# Patient Record
Sex: MALE | Race: AMERICAN INDIAN OR ALASKA NATIVE
[De-identification: names, ages, dates, MRNs, and addresses within clinical notes are randomized per-mention and may not be internally consistent; named-entity substitution may affect disease eponyms.]

---

## 2017-06-18 ENCOUNTER — HOSPITAL ENCOUNTER (EMERGENCY)
Dept: HOSPITAL 14 - H.ER | Age: 27
Discharge: HOME | End: 2017-06-18
Payer: SELF-PAY

## 2017-06-18 VITALS — TEMPERATURE: 98.8 F | DIASTOLIC BLOOD PRESSURE: 76 MMHG | HEART RATE: 84 BPM | SYSTOLIC BLOOD PRESSURE: 144 MMHG

## 2017-06-18 VITALS — OXYGEN SATURATION: 100 % | RESPIRATION RATE: 16 BRPM

## 2017-06-18 DIAGNOSIS — L03.90: Primary | ICD-10-CM

## 2017-06-18 NOTE — ED PDOC
HPI: General Adult


Time Seen by Provider: 06/18/17 12:16


Chief Complaint (Nursing): Finger,Hand,&Wrist


Chief Complaint (Provider): right finger pain


History Per: Patient (28 y/o male here for ?insect bite on right index finger 

yesterday.  States he noted area to be itchy at that time.  Today has pain/

swelling in area of possible insect bite.  Denies any difficulty flexing/

extending digit.  No fevers/chills.)





Past Medical History


Reviewed: Historical Data, Nursing Documentation, Vital Signs


Vital Signs: 





 Last Vital Signs











Temp  98.8 F   06/18/17 11:51


 


Pulse  84   06/18/17 11:51


 


Resp  16   06/18/17 11:51


 


BP  144/76   06/18/17 11:51


 


Pulse Ox  100   06/18/17 11:51














- Medical History


PMH: Anxiety, HTN


   Denies: HIV, Seizures, Sexually Transmitted Disease





- Family History


Family History: States: Unknown Family Hx





- Immunization History


Hx Tetanus Toxoid Vaccination: No


Hx Influenza Vaccination: No


Hx Pneumococcal Vaccination: No





- Home Medications


Home Medications: 


 Ambulatory Orders











 Medication  Instructions  Recorded


 


Amoxicillin/Clavulanate [Augmentin 1 tab PO BID #20 tab 01/21/17





500 MG-125 MG]  


 


Fluticasone Propionate [Flonase] 2 spr NS DAILY PRN #1 bottle 01/21/17


 


Naproxen [Naprosyn] 500 mg PO BID PRN #30 tab 01/21/17


 


Promethazine DM [Phenergan DM 5 - 10 ml PO Q8 PRN #120 ml 01/21/17





Syrup]  


 


Cephalexin [Keflex] 500 mg PO QID #28 capsule 06/18/17


 


Sulfamethoxazole/Trimethoprim 2 tab PO BID #28 tab 06/18/17





[Bactrim  mg-160 mg]  














- Allergies


Allergies/Adverse Reactions: 


 Allergies











Allergy/AdvReac Type Severity Reaction Status Date / Time


 


No Known Allergies Allergy   Verified 01/21/17 13:20














Review of Systems


ROS Statement: Except As Marked, All Systems Reviewed And Found Negative


Musculoskeletal: Positive for: Other (finger pain)





Physical Exam





- Reviewed


Nursing Documentation Reviewed: Yes


Vital Signs Reviewed: Yes





- Physical Exam


Appears: Positive for: Well, Non-toxic, No Acute Distress


Head Exam: Positive for: ATRAUMATIC, NORMAL INSPECTION, NORMOCEPHALIC


Skin: Positive for: Normal Color, Warm, DRY


Eye Exam: Positive for: EOMI, Normal appearance, PERRL


ENT: Positive for: Normal ENT Inspection


Neck: Positive for: Normal, Painless ROM


Cardiovascular/Chest: Positive for: Regular Rate, Rhythm


Respiratory: Positive for: CNT, Normal Breath Sounds


Gastrointestinal/Abdominal: Positive for: Normal Exam, Bowel Sounds, Soft


Back: Positive for: Normal Inspection


Extremity: Positive for: Normal ROM, Other (swelling/erythema noted middle 

phalanx right hand around small lesion.  Able to flex and extend digit without 

difficulty.)


Neurologic/Psych: Positive for: Alert, Oriented





- ECG


O2 Sat by Pulse Oximetry: 100





- Progress


ED Course And Treament: 





Tdap 0.5ml IM x 1 dose





KEflex 500mg x 1 dose





Bactrim DS 2 tab po x 1 dose








Disposition





- Clinical Impression


Clinical Impression: 


 Insect bite, Cellulitis








- Patient ED Disposition


Is Patient to be Admitted: No





- Disposition


Disposition: Routine/Home


Disposition Time: 12:19


Condition: FAIR


Additional Instructions: 


F/U WITH YOUR PMD OR ED IN 24-48 HOURS FOR EVALUATION OF YOUR FINGER WOUND.  

RETURN FOR ANY WORSENING OF SYMPTOMS DESPITE MEDICATIONS.


Prescriptions: 


Cephalexin [Keflex] 500 mg PO QID #28 capsule


Sulfamethoxazole/Trimethoprim [Bactrim  mg-160 mg] 2 tab PO BID #28 tab


Instructions:  Insect Bite or Sting (ED), Cellulitis (ED)

## 2017-09-01 ENCOUNTER — HOSPITAL ENCOUNTER (EMERGENCY)
Dept: HOSPITAL 14 - H.ER | Age: 27
Discharge: HOME | End: 2017-09-01
Payer: COMMERCIAL

## 2017-09-01 VITALS
SYSTOLIC BLOOD PRESSURE: 132 MMHG | HEART RATE: 79 BPM | OXYGEN SATURATION: 98 % | DIASTOLIC BLOOD PRESSURE: 74 MMHG | RESPIRATION RATE: 18 BRPM | TEMPERATURE: 98.6 F

## 2017-09-01 DIAGNOSIS — F41.9: ICD-10-CM

## 2017-09-01 DIAGNOSIS — R10.9: Primary | ICD-10-CM

## 2017-09-01 DIAGNOSIS — I10: ICD-10-CM

## 2017-09-01 LAB
ALBUMIN/GLOB SERPL: 1.6 {RATIO} (ref 1–2.1)
ALP SERPL-CCNC: 80 U/L (ref 38–126)
ALT SERPL-CCNC: 61 U/L (ref 21–72)
APTT BLD: 29.1 SECONDS (ref 25.6–37.1)
AST SERPL-CCNC: 28 U/L (ref 17–59)
BASOPHILS # BLD AUTO: 0 K/UL (ref 0–0.2)
BASOPHILS NFR BLD: 0.2 % (ref 0–2)
BILIRUB SERPL-MCNC: 0.5 MG/DL (ref 0.2–1.3)
BUN SERPL-MCNC: 15 MG/DL (ref 9–20)
CALCIUM SERPL-MCNC: 9.7 MG/DL (ref 8.4–10.2)
CHLORIDE SERPL-SCNC: 109 MMOL/L (ref 98–107)
CO2 SERPL-SCNC: 22 MMOL/L (ref 22–30)
EOSINOPHIL # BLD AUTO: 0.3 K/UL (ref 0–0.7)
EOSINOPHIL NFR BLD: 2.9 % (ref 0–4)
ERYTHROCYTE [DISTWIDTH] IN BLOOD BY AUTOMATED COUNT: 16.2 % (ref 11.5–14.5)
GLOBULIN SER-MCNC: 2.9 GM/DL (ref 2.2–3.9)
GLUCOSE SERPL-MCNC: 95 MG/DL (ref 75–110)
HCT VFR BLD CALC: 44 % (ref 35–51)
LIPASE SERPL-CCNC: 28 U/L (ref 23–300)
LYMPHOCYTES # BLD AUTO: 2.7 K/UL (ref 1–4.3)
LYMPHOCYTES NFR BLD AUTO: 29.1 % (ref 20–40)
MCH RBC QN AUTO: 23 PG (ref 27–31)
MCHC RBC AUTO-ENTMCNC: 31.5 G/DL (ref 33–37)
MCV RBC AUTO: 73 FL (ref 80–94)
MONOCYTES # BLD: 0.6 K/UL (ref 0–0.8)
MONOCYTES NFR BLD: 6 % (ref 0–10)
NEUTROPHILS # BLD: 5.8 K/UL (ref 1.8–7)
NEUTROPHILS NFR BLD AUTO: 61.8 % (ref 50–75)
NRBC BLD AUTO-RTO: 0.1 % (ref 0–0)
PLATELET # BLD: 125 K/UL (ref 130–400)
PMV BLD AUTO: 10.2 FL (ref 7.2–11.7)
POTASSIUM SERPL-SCNC: 4 MMOL/L (ref 3.6–5)
PROT SERPL-MCNC: 7.4 G/DL (ref 6.3–8.2)
SODIUM SERPL-SCNC: 143 MMOL/L (ref 132–148)
WBC # BLD AUTO: 9.4 K/UL (ref 4.8–10.8)

## 2017-09-01 PROCEDURE — 85610 PROTHROMBIN TIME: CPT

## 2017-09-01 PROCEDURE — 83992 ASSAY FOR PHENCYCLIDINE: CPT

## 2017-09-01 PROCEDURE — 99284 EMERGENCY DEPT VISIT MOD MDM: CPT

## 2017-09-01 PROCEDURE — 85025 COMPLETE CBC W/AUTO DIFF WBC: CPT

## 2017-09-01 PROCEDURE — 83690 ASSAY OF LIPASE: CPT

## 2017-09-01 PROCEDURE — 80353 DRUG SCREENING COCAINE: CPT

## 2017-09-01 PROCEDURE — 82948 REAGENT STRIP/BLOOD GLUCOSE: CPT

## 2017-09-01 PROCEDURE — 80361 OPIATES 1 OR MORE: CPT

## 2017-09-01 PROCEDURE — 85730 THROMBOPLASTIN TIME PARTIAL: CPT

## 2017-09-01 PROCEDURE — 80345 DRUG SCREENING BARBITURATES: CPT

## 2017-09-01 PROCEDURE — 80349 CANNABINOIDS NATURAL: CPT

## 2017-09-01 PROCEDURE — 80324 DRUG SCREEN AMPHETAMINES 1/2: CPT

## 2017-09-01 PROCEDURE — 80358 DRUG SCREENING METHADONE: CPT

## 2017-09-01 PROCEDURE — 80053 COMPREHEN METABOLIC PANEL: CPT

## 2017-09-01 PROCEDURE — 80346 BENZODIAZEPINES1-12: CPT

## 2017-09-01 NOTE — ED PDOC
HPI: Abdomen


Time Seen by Provider: 09/01/17 15:15


Chief Complaint (Nursing): Abdominal Pain


Chief Complaint (Provider): loss of appetite


History Per: Patient


Onset/Duration Of Symptoms: Days (6), Intermittent Episodes


Current Symptoms Are (Timing): Still Present


Location Of Pain/Discomfort: Diffuse


Quality Of Discomfort: Gas


Associated Symptoms: Loss Of Appetite.  denies: Fever, Chills, Nausea, Vomiting

, Diarrhea, Constipation, Urinary Symptoms


Exacerbating Factors: None


Alleviating Factors: None


Additional Complaint(s): 





Six days ago started to have intermittent gas pains which he thought was hunger

, but when he would eat, feel full too quickly and not eat much. Pain resolved 

2 days ago. But loss of appetite and rapid fullness persists. Concerned that he 

seems to have unintentionally lost weight as well (about 15 lbs over 2 months)


2 weeks ago had an episode of severe diarrhea associated with more severe 

abdominal cramping, which resolved spontaneously after a day.





PMD Dr Wilson (not seen in about a year)





Past Medical History


Reviewed: Historical Data, Nursing Documentation, Vital Signs


Vital Signs: 


 Last Vital Signs











Temp  97.2 F L  09/01/17 15:08


 


Pulse  114 H  09/01/17 15:08


 


Resp  20   09/01/17 15:08


 


BP  129/82   09/01/17 15:08


 


Pulse Ox  99   09/01/17 15:49














- Medical History


PMH: Anxiety, HTN (stopped taking meds)


   Denies: HIV, Seizures, Sexually Transmitted Disease





- Surgical History


Surgical History: No Surg Hx





- Family History


Family History: States: Unknown Family Hx





- Social History


Current smoker - smoking cessation education provided: No


Alcohol: Occasional


Drugs: Cannabis





- Home Medications


Home Medications: 


 Ambulatory Orders











 Medication  Instructions  Recorded


 


Amoxicillin/Clavulanate [Augmentin 1 tab PO BID #20 tab 01/21/17





500 MG-125 MG]  


 


Fluticasone Propionate [Flonase] 2 spr NS DAILY PRN #1 bottle 01/21/17


 


Naproxen [Naprosyn] 500 mg PO BID PRN #30 tab 01/21/17


 


Promethazine DM [Phenergan DM 5 - 10 ml PO Q8 PRN #120 ml 01/21/17





Syrup]  


 


Cephalexin [Keflex] 500 mg PO QID #28 capsule 06/18/17


 


Sulfamethoxazole/Trimethoprim 2 tab PO BID #28 tab 06/18/17





[Bactrim  mg-160 mg]  


 


Omeprazole Magnesium [Prilosec Otc] 20 mg PO DAILY #30 tcp 09/01/17














- Allergies


Allergies/Adverse Reactions: 


 Allergies











Allergy/AdvReac Type Severity Reaction Status Date / Time


 


No Known Allergies Allergy   Verified 01/21/17 13:20














Review of Systems


ROS Statement: Except As Marked, All Systems Reviewed And Found Negative (and 

as per HPI)


Gastrointestinal: Positive for: Abdominal Pain.  Negative for: Nausea, Vomiting

, Diarrhea, Constipation, Melena, Hematochezia, Hematemesis, Rectal Pain


Genitourinary Male: Negative for: Dysuria, Frequency





Physical Exam





- Reviewed


Nursing Documentation Reviewed: Yes


Vital Signs Reviewed: Yes





- Physical Exam


Appears: Positive for: Non-toxic, No Acute Distress


Head Exam: Positive for: ATRAUMATIC, NORMOCEPHALIC


Skin: Positive for: Warm, Dry


Eye Exam: Positive for: EOMI, PERRL


ENT: Positive for: Other (dry muc membranes).  Negative for: Pharyngeal Erythema

, Tonsillar Exudate


Neck: Positive for: Painless ROM, Supple


Cardiovascular/Chest: Positive for: Chest Non Tender, Tachycardia (regular 

rhythm)


Respiratory: Positive for: Normal Breath Sounds.  Negative for: Wheezing, 

Respiratory Distress


Gastrointestinal/Abdominal: Positive for: Bowel Sounds, Soft.  Negative for: 

Tenderness, Mass, Distended, Guarding, Rebound


Back: Positive for: Normal Inspection.  Negative for: Decreased ROM


Extremity: Positive for: Normal ROM.  Negative for: Pedal Edema, Deformity


Lymphatic: Negative for: Adenopathy


Neurologic/Psych: Positive for: Alert, Mood/Affect (mildly anxious affect).  

Negative for: Motor/Sensory Deficits





- Laboratory Results


Result Diagrams: 


 09/01/17 15:41





 09/01/17 15:41





- ECG


O2 Sat by Pulse Oximetry: 99





Medical Decision Making


Medical Decision Making: 





Persistent loss of appetite following episodes of abdominal pain which have 

resolved. Signs of dehydration, otherwise physical exam benign and pt in no 

distress. 





Ddx include but not limited to gastritis, PUD, pancreatitis, hepatitis, 

dyspepsia, dehydration.





No emergently significant lab abnormalities.





DW pt findings. Rx as gastritis/dyspepsia. Follow up clinic and possible GI 

clinic.





Disposition





- Clinical Impression


Clinical Impression: 


 Abdominal pain





Counseled Patient/Family Regarding: Studies Performed, Diagnosis, Need For 

Followup, Rx Given





- Disposition


Referrals: 


[facility] [Outside]


[facility] [Outside] (CALL TO SETUP APPOINTMENT WITH CLINIC 

WITHIN A WEEK. FROM THERE YOU WILL GET A REFERRAL TO GI CLINIC.)


Disposition: Routine/Home


Disposition Time: 17:01


Condition: GOOD


Prescriptions: 


Omeprazole Magnesium [Prilosec Otc] 20 mg PO DAILY #30 tcp


Instructions:  Abdominal Pain (ED)

## 2018-08-24 ENCOUNTER — HOSPITAL ENCOUNTER (EMERGENCY)
Dept: HOSPITAL 14 - H.ER | Age: 28
Discharge: HOME | End: 2018-08-24
Payer: MEDICAID

## 2018-08-24 VITALS — OXYGEN SATURATION: 99 % | TEMPERATURE: 98.4 F

## 2018-08-24 VITALS — BODY MASS INDEX: 31.7 KG/M2

## 2018-08-24 VITALS — DIASTOLIC BLOOD PRESSURE: 88 MMHG | SYSTOLIC BLOOD PRESSURE: 150 MMHG | HEART RATE: 72 BPM | RESPIRATION RATE: 18 BRPM

## 2018-08-24 DIAGNOSIS — M72.2: Primary | ICD-10-CM

## 2018-08-24 DIAGNOSIS — F41.9: ICD-10-CM

## 2018-08-24 DIAGNOSIS — I10: ICD-10-CM

## 2018-08-24 LAB
BASOPHILS # BLD AUTO: 0.1 K/UL (ref 0–0.2)
BASOPHILS NFR BLD: 0.9 % (ref 0–2)
EOSINOPHIL # BLD AUTO: 0.3 K/UL (ref 0–0.7)
EOSINOPHIL NFR BLD: 3.1 % (ref 0–4)
ERYTHROCYTE [DISTWIDTH] IN BLOOD BY AUTOMATED COUNT: 16.1 % (ref 11.5–14.5)
ERYTHROCYTE [SEDIMENTATION RATE] IN BLOOD: 8 MM/HR (ref 0–15)
HGB BLD-MCNC: 13.1 G/DL (ref 12–18)
LYMPHOCYTES # BLD AUTO: 2.5 K/UL (ref 1–4.3)
LYMPHOCYTES NFR BLD AUTO: 26.6 % (ref 20–40)
MCH RBC QN AUTO: 23.4 PG (ref 27–31)
MCHC RBC AUTO-ENTMCNC: 32.1 G/DL (ref 33–37)
MCV RBC AUTO: 73.1 FL (ref 80–94)
MONOCYTES # BLD: 0.6 K/UL (ref 0–0.8)
MONOCYTES NFR BLD: 6.6 % (ref 0–10)
NEUTROPHILS # BLD: 5.8 K/UL (ref 1.8–7)
NEUTROPHILS NFR BLD AUTO: 62.8 % (ref 50–75)
NRBC BLD AUTO-RTO: 0.1 % (ref 0–0)
PLATELET # BLD: 131 K/UL (ref 130–400)
PMV BLD AUTO: 9.7 FL (ref 7.2–11.7)
RBC # BLD AUTO: 5.61 MIL/UL (ref 4.4–5.9)
WBC # BLD AUTO: 9.3 K/UL (ref 4.8–10.8)

## 2018-08-24 PROCEDURE — 85025 COMPLETE CBC W/AUTO DIFF WBC: CPT

## 2018-08-24 PROCEDURE — 96372 THER/PROPH/DIAG INJ SC/IM: CPT

## 2018-08-24 PROCEDURE — 73610 X-RAY EXAM OF ANKLE: CPT

## 2018-08-24 PROCEDURE — 85651 RBC SED RATE NONAUTOMATED: CPT

## 2018-08-24 PROCEDURE — 99284 EMERGENCY DEPT VISIT MOD MDM: CPT

## 2018-08-24 PROCEDURE — 73630 X-RAY EXAM OF FOOT: CPT

## 2018-08-24 PROCEDURE — 82948 REAGENT STRIP/BLOOD GLUCOSE: CPT

## 2018-08-24 PROCEDURE — 73590 X-RAY EXAM OF LOWER LEG: CPT

## 2018-08-24 NOTE — RAD
Date of service: 



08/24/2018



PROCEDURE:  Left Foot Radiographs.



HISTORY:

foot pain/ heel  



COMPARISON:

None.



FINDINGS:



BONES:

Normal. No fracture. 



JOINTS:

Normal. 



SOFT TISSUES:

Normal. 



OTHER FINDINGS:

None.



IMPRESSION:

Normal left foot radiographs.

## 2018-08-24 NOTE — RAD
Date of service: 



08/24/2018



PROCEDURE:  Left Ankle Radiographs.



HISTORY:

leg pain  



COMPARISON:

None



FINDINGS:



BONES:

Normal. No fracture. 



JOINTS:

Normal. No osteoarthritis. Ankle mortise maintained. Talar dome intact



SOFT TISSUES:

Normal. 



OTHER FINDINGS:

None.



IMPRESSION:

Normal left ankle radiographs.

## 2018-08-24 NOTE — RAD
Date of service: 



08/24/2018



PROCEDURE:  Radiographs of the left tibia and fibula. 



HISTORY:

leg pain



COMPARISON:

None available.



TECHNIQUE:

Frontal and lateral views obtained. 



FINDINGS:



BONES:

No fracture or destructive lesion.



JOINT SPACES:

Unremarkable.



OTHER FINDINGS:

None.



IMPRESSION:

Unremarkable radiographs of the left tibia and fibula.

## 2018-08-24 NOTE — CP.PCM.CON
History of Present Illness





- History of Present Illness


History of Present Illness: 





Podiatry consult note for Dr. Arshad,





29 yo male with hypertension was seen in the ED along with his significant 

other. Patient complains of pain to the left foot mainly in the heel radiating 

up the left lower leg. Patient describes it as sharp shooting pain when he 

ambulates. Patient states the pain came out of nowhere, and has been constant 

in the heel. Patient denies taking any pain medications for the pain, and 

denies seeing any other physician regarding the pain. Patient states he also 

has a open lesion on the left leg, and states a bug bit him four days ago. It 

start off as a blister and has been open for the last 3 days. Patient does not 

recall the last time he has primary care physician however states it has been 

over a year. Patient denies covering the wound. Patient denies f/n/v/sob/

chills. Patient states he was vomiting two days ago, because he had too much to 

drink the night before





Surgical history: none


Allergies: NKFDA


Social history: Denies smoking cigarettes, occasional drinker, smokes marijuana 

daily.  





Past Patient History





- Infectious Disease


Hx of Infectious Diseases: None





- Past Social History


Smoking Status: Former Smoker





- CARDIAC


Hx Hypertension: Yes (stopped taking meds)





- PULMONARY


Hx Tuberculosis: No





- NEUROLOGICAL


Hx Seizures: No





- HEMATOLOGICAL/ONCOLOGICAL


Hx Human Immunodeficiency Virus (HIV): No





- GENITOURINARY/GYNECOLOGICAL


Hx Sexually Transmitted Disorders: No





- PSYCHIATRIC


Hx Anxiety: Yes





- SURGICAL HISTORY


Hx Surgeries: No





- ANESTHESIA


Hx Anesthesia: No





Meds


Home Medications: 


 Home Medication List











 Medication  Instructions  Recorded  Confirmed  Type


 


Naproxen [Naprosyn] 500 mg PO BID PRN #14 tablet 08/24/18  Rx











Allergies/Adverse Reactions: 


 Allergies











Allergy/AdvReac Type Severity Reaction Status Date / Time


 


No Known Allergies Allergy   Verified 08/24/18 09:54














Physical Exam





- Constitutional


Appears: Well, Non-toxic, No Acute Distress





- Head Exam


Head Exam: ATRAUMATIC, NORMOCEPHALIC





- Extremities Exam


Additional comments: 


Left lower extremity exam:





Vascular: DP/PT 2/4, CFT <3 secs x10, TG warm to warm, no edema noted, no 

erythema noted in the foot or leg





Neuro: Protective sensation intact via ipswich 4/4





Derm: No open lesions on the foot, Circular Wound noted on the medial aspect of 

the midleg measuring 1.5cm, 50/50 fibrotic and granular base, no malodor, no 

drainage, no erythema, no purulence, no probe to bone, no tunneling, no 

tracking noted.





Ortho: Pain on palpation to the medial and lateral tubercle of the left 

calcaneus, no pain with ROM of the AJ or STJ, no pain on palpation to the 

achilles tendon, no pain on palpation to the plantar fascia, no pain with calf 

squeeze











- Neurological Exam


Neurological exam: Alert, Oriented x3





- Psychiatric Exam


Psychiatric exam: Normal Affect, Normal Mood





Results





- Vital Signs


Recent Vital Signs: 


 Last Vital Signs











Temp  98.4 F   08/24/18 09:55


 


Pulse  99 H  08/24/18 09:55


 


Resp  20   08/24/18 09:55


 


BP  135/82   08/24/18 09:55


 


Pulse Ox  99   08/24/18 11:46














- Labs


Result Diagrams: 


 08/24/18 12:25








Assessment & Plan





- Assessment and Plan (Free Text)


Assessment: 





29 yo male seen in the ED for left foot pain possible plantar fasciitis. 


Plan: 





Patient seen and evaluated


History and plan discussed in detail with the attending, Dr. Arshad


CBC and ESR ordered; WBC 9.3 ESr 8


Chart, labs and vitals reviewed


X-rays of foot, ankle, and tib/fibular reviewed: no osseous abnormalities noted

, no fractures


Left leg dressed with gauze and DSD; patient advised to keep it covered at all 

times


Left foot wrapped with ACE bandage and surgical shoe dispensed.


Patient to weight bear as tolerated to the forefoot


Oral antiinflammatories rx to be taken as advised


Patient showed verbal understanding


Patient will follow up in podiatry clinic


Thank you for the consult.

## 2018-08-24 NOTE — ED PDOC
Lower Extremity Pain/Injury


Time Seen by Provider: 08/24/18 10:14


Chief Complaint (Nursing): Lower Extremity Problem/Injury


Chief Complaint (Provider): left foot pain


History Per: Patient


History/Exam Limitations: no limitations


Onset/Duration Of Symptoms: Gradual (3 days)


Current Symptoms Are (Timing): Still Present


Severity: Moderate


Additional Complaint(s): 





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 

mostly heel/plantar area radiating up to lower leg. Denies falls twists or 

trauma. 


Denies R foot or ankle pain. Denies taking any medications for pain.  Wears 

clogs when cooking. 





Past Medical History


Reviewed: Historical Data


Vital Signs: 





 Last Vital Signs











Temp  98.4 F   08/24/18 09:55


 


Pulse  99 H  08/24/18 09:55


 


Resp  20   08/24/18 09:55


 


BP  135/82   08/24/18 09:55


 


Pulse Ox  99   08/24/18 09:55














- Medical History


PMH: Anxiety, HTN (stopped taking meds)


   Denies: HIV, Seizures, Sexually Transmitted Disease





- Surgical History


Surgical History: No Surg Hx





- Family History


Family History: States: Unknown Family Hx





- Living Arrangements


Living Arrangements: With Family





- Social History


Current smoker - smoking cessation education provided: No





- Immunization History


Hx Tetanus Toxoid Vaccination: No


Hx Influenza Vaccination: No


Hx Pneumococcal Vaccination: No





- Home Medications


Home Medications: 


 Ambulatory Orders











 Medication  Instructions  Recorded


 


Amoxicillin/Clavulanate [Augmentin 1 tab PO BID #20 tab 01/21/17





500 MG-125 MG]  


 


Fluticasone Propionate [Flonase] 2 spr NS DAILY PRN #1 bottle 01/21/17


 


Naproxen [Naprosyn] 500 mg PO BID PRN #30 tab 01/21/17


 


Promethazine DM [Phenergan DM 5 - 10 ml PO Q8 PRN #120 ml 01/21/17





Syrup]  


 


Cephalexin [Keflex] 500 mg PO QID #28 capsule 06/18/17


 


Sulfamethoxazole/Trimethoprim 2 tab PO BID #28 tab 06/18/17





[Bactrim  mg-160 mg]  


 


Omeprazole Magnesium [Prilosec Otc] 20 mg PO DAILY #30 tcp 09/01/17


 


Naproxen [Naprosyn] 500 mg PO BID PRN #14 tablet 08/24/18














- Allergies


Allergies/Adverse Reactions: 


 Allergies











Allergy/AdvReac Type Severity Reaction Status Date / Time


 


No Known Allergies Allergy   Verified 08/24/18 09:54














Review of Systems


Constitutional: Negative for: Fever


Cardiovascular: Negative for: Chest Pain


Respiratory: Negative for: Shortness of Breath


Musculoskeletal: Positive for: Leg Pain, Foot Pain.  Negative for: Neck Pain


Skin: Negative for: Rash


Neurological: Negative for: Weakness





Physical Exam





- Reviewed


Nursing Documentation Reviewed: Yes


Vital Signs Reviewed: Yes





- Physical Exam


Appears: Positive for: Well, Non-toxic, No Acute Distress


Skin: Positive for: Normal Color, Warm, DRY


Eye Exam: Positive for: EOMI, Normal appearance, PERRL


ENT: Positive for: Normal ENT Inspection


Neck: Positive for: Normal, Painless ROM


Respiratory: Negative for: Respiratory Distress


Back: Positive for: Normal Inspection


Extremity: Positive for: Tenderness (L foot plantar heel, mild L ankle 

tenderness, mild L achilles insertion tenderness).  Negative for: Deformity, 

Swelling


Neurologic/Psych: Positive for: Alert, Oriented.  Negative for: Motor/Sensory 

Deficits





- Laboratory Results


Result Diagrams: 


 08/24/18 12:25








- ECG


O2 Sat by Pulse Oximetry: 99





Medical Decision Making


Medical Decision Making: 





xrays, analgesia and podiatry consult ordered





labs and xray reviewed, podiatry consult performed, ACE wrap applied and will 

followup pod clinic, Rx NSAIDs with instructions for safe use discussed.





Disposition





- Clinical Impression


Clinical Impression: 


 Plantar fasciitis








- Patient ED Disposition


Is Patient to be Admitted: No


Counseled Patient/Family Regarding: Studies Performed, Diagnosis, Need For 

Followup, Rx Given





- Disposition


Referrals: 


Podiatry Clinic [Outside]


Disposition: Routine/Home


Disposition Time: 13:30


Condition: STABLE


Additional Instructions: 


Followup with podiatry for further testing.





Prescriptions: 


Naproxen [Naprosyn] 500 mg PO BID PRN #14 tablet


 PRN Reason: Pain, Moderate (4-7)


Instructions:  Heel Pain (Caused by Plantar Fasciitis)


Forms:  [employer] (English)